# Patient Record
Sex: FEMALE | Race: WHITE
[De-identification: names, ages, dates, MRNs, and addresses within clinical notes are randomized per-mention and may not be internally consistent; named-entity substitution may affect disease eponyms.]

---

## 2018-04-09 ENCOUNTER — LABORATORY RESULT (OUTPATIENT)
Age: 33
End: 2018-04-09

## 2018-04-09 ENCOUNTER — APPOINTMENT (OUTPATIENT)
Dept: INTERNAL MEDICINE | Facility: CLINIC | Age: 33
End: 2018-04-09
Payer: COMMERCIAL

## 2018-04-09 VITALS
OXYGEN SATURATION: 97 % | HEART RATE: 82 BPM | HEIGHT: 65 IN | TEMPERATURE: 98.5 F | SYSTOLIC BLOOD PRESSURE: 136 MMHG | BODY MASS INDEX: 39.65 KG/M2 | WEIGHT: 238 LBS | DIASTOLIC BLOOD PRESSURE: 84 MMHG | RESPIRATION RATE: 14 BRPM

## 2018-04-09 DIAGNOSIS — D22.9 MELANOCYTIC NEVI, UNSPECIFIED: ICD-10-CM

## 2018-04-09 DIAGNOSIS — Z84.89 FAMILY HISTORY OF OTHER SPECIFIED CONDITIONS: ICD-10-CM

## 2018-04-09 DIAGNOSIS — M79.645 PAIN IN LEFT FINGER(S): ICD-10-CM

## 2018-04-09 DIAGNOSIS — Z87.891 PERSONAL HISTORY OF NICOTINE DEPENDENCE: ICD-10-CM

## 2018-04-09 PROCEDURE — 36415 COLL VENOUS BLD VENIPUNCTURE: CPT

## 2018-04-09 PROCEDURE — 99395 PREV VISIT EST AGE 18-39: CPT | Mod: 25

## 2018-04-10 RX ORDER — NAPROXEN SODIUM 220 MG
220 TABLET ORAL
Refills: 0 | Status: ACTIVE | COMMUNITY

## 2018-04-11 LAB
25(OH)D3 SERPL-MCNC: 12.6 NG/ML
ALBUMIN SERPL ELPH-MCNC: 4.3 G/DL
ALP BLD-CCNC: 62 U/L
ALT SERPL-CCNC: 13 U/L
ANION GAP SERPL CALC-SCNC: 15 MMOL/L
AST SERPL-CCNC: 15 U/L
BASOPHILS # BLD AUTO: 0.07 K/UL
BASOPHILS NFR BLD AUTO: 0.5 %
BILIRUB SERPL-MCNC: 1.1 MG/DL
BUN SERPL-MCNC: 9 MG/DL
C TRACH RRNA SPEC QL NAA+PROBE: NOT DETECTED
CALCIUM SERPL-MCNC: 9.4 MG/DL
CHLORIDE SERPL-SCNC: 102 MMOL/L
CHOLEST SERPL-MCNC: 139 MG/DL
CHOLEST/HDLC SERPL: 2.8 RATIO
CO2 SERPL-SCNC: 22 MMOL/L
CREAT SERPL-MCNC: 0.8 MG/DL
EOSINOPHIL # BLD AUTO: 2.56 K/UL
EOSINOPHIL NFR BLD AUTO: 18.4 %
GLUCOSE SERPL-MCNC: 92 MG/DL
HBA1C MFR BLD HPLC: 4.8 %
HBV CORE IGG+IGM SER QL: NONREACTIVE
HBV SURFACE AB SER QL: NONREACTIVE
HBV SURFACE AG SER QL: NONREACTIVE
HCT VFR BLD CALC: 35.5 %
HCV AB SER QL: NONREACTIVE
HCV S/CO RATIO: 0.14 S/CO
HDLC SERPL-MCNC: 50 MG/DL
HGB BLD-MCNC: 11.3 G/DL
HIV1+2 AB SPEC QL IA.RAPID: NONREACTIVE
IMM GRANULOCYTES NFR BLD AUTO: 0.4 %
LDLC SERPL CALC-MCNC: 53 MG/DL
LYMPHOCYTES # BLD AUTO: 3.59 K/UL
LYMPHOCYTES NFR BLD AUTO: 25.8 %
MAN DIFF?: NORMAL
MCHC RBC-ENTMCNC: 20.4 PG
MCHC RBC-ENTMCNC: 31.8 GM/DL
MCV RBC AUTO: 64.1 FL
MONOCYTES # BLD AUTO: 0.76 K/UL
MONOCYTES NFR BLD AUTO: 5.5 %
N GONORRHOEA RRNA SPEC QL NAA+PROBE: NOT DETECTED
NEUTROPHILS # BLD AUTO: 6.89 K/UL
NEUTROPHILS NFR BLD AUTO: 49.4 %
PLATELET # BLD AUTO: 267 K/UL
POTASSIUM SERPL-SCNC: 4.1 MMOL/L
PROT SERPL-MCNC: 7.9 G/DL
RBC # BLD: 5.54 M/UL
RBC # FLD: 16.2 %
SODIUM SERPL-SCNC: 139 MMOL/L
SOURCE AMPLIFICATION: NORMAL
T PALLIDUM AB SER QL IA: NEGATIVE
TRIGL SERPL-MCNC: 180 MG/DL
TSH SERPL-ACNC: 2.89 UIU/ML
WBC # FLD AUTO: 13.93 K/UL

## 2018-04-11 RX ORDER — ERGOCALCIFEROL 1.25 MG/1
1.25 MG CAPSULE, LIQUID FILLED ORAL
Qty: 8 | Refills: 0 | Status: ACTIVE | COMMUNITY
Start: 2018-04-11 | End: 1900-01-01

## 2018-04-20 ENCOUNTER — APPOINTMENT (OUTPATIENT)
Dept: INFECTIOUS DISEASE | Facility: CLINIC | Age: 33
End: 2018-04-20

## 2018-05-03 ENCOUNTER — LABORATORY RESULT (OUTPATIENT)
Age: 33
End: 2018-05-03

## 2018-05-04 LAB
BASOPHILS # BLD AUTO: 0.08 K/UL
BASOPHILS NFR BLD AUTO: 0.5 %
EOSINOPHIL # BLD AUTO: 2.73 K/UL
EOSINOPHIL NFR BLD AUTO: 16.4 %
FERRITIN SERPL-MCNC: 220 NG/ML
FOLATE SERPL-MCNC: 17.6 NG/ML
HCT VFR BLD CALC: 30.2 %
HGB BLD-MCNC: 10 G/DL
IMM GRANULOCYTES NFR BLD AUTO: 0.7 %
IRON SATN MFR SERPL: 12 %
IRON SERPL-MCNC: 27 UG/DL
LYMPHOCYTES # BLD AUTO: 3.45 K/UL
LYMPHOCYTES NFR BLD AUTO: 20.7 %
MAN DIFF?: NORMAL
MCHC RBC-ENTMCNC: 20.6 PG
MCHC RBC-ENTMCNC: 33.1 GM/DL
MCV RBC AUTO: 62.3 FL
MONOCYTES # BLD AUTO: 0.93 K/UL
MONOCYTES NFR BLD AUTO: 5.6 %
NEUTROPHILS # BLD AUTO: 9.34 K/UL
NEUTROPHILS NFR BLD AUTO: 56.1 %
PLATELET # BLD AUTO: 247 K/UL
RBC # BLD: 4.85 M/UL
RBC # FLD: 15.7 %
TIBC SERPL-MCNC: 222 UG/DL
UIBC SERPL-MCNC: 195 UG/DL
VIT B12 SERPL-MCNC: 415 PG/ML
WBC # FLD AUTO: 16.65 K/UL

## 2018-05-04 RX ORDER — CHLORHEXIDINE GLUCONATE 4 %
325 (65 FE) LIQUID (ML) TOPICAL
Refills: 0 | Status: ACTIVE | COMMUNITY
Start: 2018-05-04

## 2018-05-07 LAB
HGB A MFR BLD: 92.6 %
HGB A2 MFR BLD: 5.1 %
HGB F MFR BLD: 2.3 %
HGB FRACT BLD-IMP: NORMAL

## 2018-05-11 ENCOUNTER — APPOINTMENT (OUTPATIENT)
Dept: INTERNAL MEDICINE | Facility: CLINIC | Age: 33
End: 2018-05-11

## 2018-05-14 ENCOUNTER — APPOINTMENT (OUTPATIENT)
Dept: ORTHOPEDIC SURGERY | Facility: CLINIC | Age: 33
End: 2018-05-14
Payer: COMMERCIAL

## 2018-05-14 VITALS
WEIGHT: 230 LBS | HEIGHT: 66 IN | SYSTOLIC BLOOD PRESSURE: 136 MMHG | HEART RATE: 109 BPM | BODY MASS INDEX: 36.96 KG/M2 | DIASTOLIC BLOOD PRESSURE: 93 MMHG

## 2018-05-14 DIAGNOSIS — M72.2 PLANTAR FASCIAL FIBROMATOSIS: ICD-10-CM

## 2018-05-14 DIAGNOSIS — M77.9 ENTHESOPATHY, UNSPECIFIED: ICD-10-CM

## 2018-05-14 DIAGNOSIS — M62.89 OTHER SPECIFIED DISORDERS OF MUSCLE: ICD-10-CM

## 2018-05-14 DIAGNOSIS — M76.60 ACHILLES TENDINITIS, UNSPECIFIED LEG: ICD-10-CM

## 2018-05-14 PROCEDURE — 99203 OFFICE O/P NEW LOW 30 MIN: CPT

## 2018-05-14 PROCEDURE — 73630 X-RAY EXAM OF FOOT: CPT | Mod: 50

## 2018-05-14 PROCEDURE — 73600 X-RAY EXAM OF ANKLE: CPT | Mod: RT

## 2018-05-15 LAB — HEMOCCULT STL QL IA: NEGATIVE

## 2018-05-17 ENCOUNTER — APPOINTMENT (OUTPATIENT)
Dept: INTERNAL MEDICINE | Facility: CLINIC | Age: 33
End: 2018-05-17
Payer: COMMERCIAL

## 2018-05-17 ENCOUNTER — TRANSCRIPTION ENCOUNTER (OUTPATIENT)
Age: 33
End: 2018-05-17

## 2018-05-17 ENCOUNTER — LABORATORY RESULT (OUTPATIENT)
Age: 33
End: 2018-05-17

## 2018-05-17 VITALS
DIASTOLIC BLOOD PRESSURE: 84 MMHG | TEMPERATURE: 98.1 F | RESPIRATION RATE: 16 BRPM | WEIGHT: 230 LBS | BODY MASS INDEX: 36.96 KG/M2 | HEIGHT: 66 IN | HEART RATE: 89 BPM | OXYGEN SATURATION: 98 % | SYSTOLIC BLOOD PRESSURE: 130 MMHG

## 2018-05-17 DIAGNOSIS — E66.9 OBESITY, UNSPECIFIED: ICD-10-CM

## 2018-05-17 DIAGNOSIS — D56.3 THALASSEMIA MINOR: ICD-10-CM

## 2018-05-17 DIAGNOSIS — E55.9 VITAMIN D DEFICIENCY, UNSPECIFIED: ICD-10-CM

## 2018-05-17 PROCEDURE — 99215 OFFICE O/P EST HI 40 MIN: CPT | Mod: 25

## 2018-05-17 PROCEDURE — 36415 COLL VENOUS BLD VENIPUNCTURE: CPT

## 2018-05-17 PROCEDURE — 90746 HEPB VACCINE 3 DOSE ADULT IM: CPT

## 2018-05-17 PROCEDURE — 90471 IMMUNIZATION ADMIN: CPT

## 2018-05-17 RX ORDER — CHROMIUM 200 MCG
1000 TABLET ORAL DAILY
Qty: 1 | Refills: 1 | Status: ACTIVE | COMMUNITY
Start: 2018-05-17 | End: 1900-01-01

## 2018-05-17 RX ORDER — FERROUS GLUCONATE 324(38)MG
324 (38 FE) TABLET ORAL 3 TIMES DAILY
Qty: 1 | Refills: 3 | Status: ACTIVE | COMMUNITY
Start: 2018-05-17 | End: 1900-01-01

## 2018-05-18 PROBLEM — D56.3 BETA THALASSEMIA MINOR: Status: ACTIVE | Noted: 2018-05-18

## 2018-05-18 LAB
ANION GAP SERPL CALC-SCNC: 15 MMOL/L
BASOPHILS # BLD AUTO: 0.07 K/UL
BASOPHILS NFR BLD AUTO: 0.6 %
BUN SERPL-MCNC: 11 MG/DL
CALCIUM SERPL-MCNC: 9.7 MG/DL
CHLORIDE SERPL-SCNC: 100 MMOL/L
CO2 SERPL-SCNC: 25 MMOL/L
CREAT SERPL-MCNC: 0.75 MG/DL
EOSINOPHIL # BLD AUTO: 1.08 K/UL
EOSINOPHIL NFR BLD AUTO: 9.2 %
GLUCOSE SERPL-MCNC: 102 MG/DL
HCT VFR BLD CALC: 35.1 %
HGB BLD-MCNC: 11.1 G/DL
IMM GRANULOCYTES NFR BLD AUTO: 0.5 %
LYMPHOCYTES # BLD AUTO: 3.09 K/UL
LYMPHOCYTES NFR BLD AUTO: 26.3 %
MAN DIFF?: NORMAL
MCHC RBC-ENTMCNC: 20.3 PG
MCHC RBC-ENTMCNC: 31.6 GM/DL
MCV RBC AUTO: 64.2 FL
MONOCYTES # BLD AUTO: 0.66 K/UL
MONOCYTES NFR BLD AUTO: 5.6 %
NEUTROPHILS # BLD AUTO: 6.8 K/UL
NEUTROPHILS NFR BLD AUTO: 57.8 %
PLATELET # BLD AUTO: 323 K/UL
POTASSIUM SERPL-SCNC: 4.3 MMOL/L
RBC # BLD: 5.47 M/UL
RBC # FLD: 15.4 %
SODIUM SERPL-SCNC: 140 MMOL/L
WBC # FLD AUTO: 11.76 K/UL

## 2018-05-18 NOTE — HEALTH RISK ASSESSMENT
[Patient reported PAP Smear was normal] : Patient reported PAP Smear was normal [PapSmearDate] : > 10 yrs ago

## 2018-05-18 NOTE — HISTORY OF PRESENT ILLNESS
[FreeTextEntry1] : \par Feeling well today.\par \par \par Reports went to urgent care last week with URI sx's, cough- dx'd bronchitis, given zpak (finished 5/11/18) and now improved-has min cough, dry\par -denies fever, chills or sob\par -denies GI or  complaints.\par -told needs noted to clear for fit testing at [facility] given recent illness\par \par \par hx staring episodes- no recurrence since last visit- neuro eval pending 6/18\par -Wife reports episode 6 mo ago when patient was awake but not responding to name (was laying down just staring at TV) and recalls eyes were moving side to side quickly, observed x 30 seconds- when came to seemed confused x 1 min.  Denies body jerking, foaming at mouth, tongue biting or incontinence. -Pt recalls hx similar episode while alone few months ago - was laying there and felt couldn't move body x few minutes- episode not observed\par -denies vision trouble, HA, dizziness, dysarthria or focal weakness\par -denies hx seizure, hx dx in [facility]\par \par hx right foot pain-- hx ligamentous tear tx'd conservatively, improved but gets occ pain due feels is due to heel spur and plantar fasciitis\par -seen by new ortho 5/18, xrays done and referred to PT which is pending\par -on aleve prn with help, denies GI sx's with use\par \par Recently started cut down of meat\par exercising: gym machines- treadmill 1-2x/wk x 20 mins\par \par vit d def-- on tx course\par \par \par Denies  complaints.\par

## 2018-05-18 NOTE — PHYSICAL EXAM
[General Appearance - Alert] : alert [General Appearance - In No Acute Distress] : in no acute distress [Sclera] : the sclera and conjunctiva were normal [PERRL With Normal Accommodation] : pupils were equal in size, round, and reactive to light [Extraocular Movements] : extraocular movements were intact [Outer Ear] : the ears and nose were normal in appearance [Nasal Cavity] : the nasal mucosa and septum were normal [Oropharynx] : the oropharynx was normal [Neck Appearance] : the appearance of the neck was normal [Thyroid Diffuse Enlargement] : the thyroid was not enlarged [Respiration, Rhythm And Depth] : normal respiratory rhythm and effort [Auscultation Breath Sounds / Voice Sounds] : lungs were clear to auscultation bilaterally [Heart Rate And Rhythm] : heart rate was normal and rhythm regular [Heart Sounds] : normal S1 and S2 [Murmurs] : no murmurs [Full Pulse] : the pedal pulses are present [Edema] : there was no peripheral edema [Bowel Sounds] : normal bowel sounds [Abdomen Soft] : soft [Abdomen Tenderness] : non-tender [Cervical Lymph Nodes Enlarged Posterior Bilaterally] : posterior cervical [Cervical Lymph Nodes Enlarged Anterior Bilaterally] : anterior cervical [Supraclavicular Lymph Nodes Enlarged Bilaterally] : supraclavicular [No CVA Tenderness] : no ~M costovertebral angle tenderness [No Spinal Tenderness] : no spinal tenderness [Abnormal Walk] : normal gait [] : no rash [No Focal Deficits] : no focal deficits [Oriented To Time, Place, And Person] : oriented to person, place, and time [Affect] : the affect was normal [Mood] : the mood was normal [FreeTextEntry1] : scattered freckles

## 2018-05-18 NOTE — REVIEW OF SYSTEMS
[see HPI] : see HPI [Negative] : Psychiatric [Fever] : no fever [Chills] : no chills [Chest Pain] : no chest pain [Palpitations] : no palpitations [Cough] : no cough [SOB on Exertion] : no shortness of breath during exertion

## 2018-05-18 NOTE — ASSESSMENT
[FreeTextEntry1] : \par hx bronchitis- s/p urgent care eval with zpak (finished 5/11/18)- improved now with min residual cough\par -check cbc to monitor prior noted leukocytosis\par -note given to proceed with fit testing at [facility]\par \par hx staring episodes a/w ? nystagmus- possible seizure; neuro exam wnl \par -neurology eval pending 6/18\par -advised prompt ER eval if sx's recur\par \par hx right foot pain- onset 2014 heard "pop" with stepping back w/o hx of focal trauma\par -8/14 xray w/o fracture or dislocation, +calcaneal spur\par -9/14 MRI: severe plantar fasciitis with mild partial thickness tear, large plantar calcaneal enthesophyte\par -seen by new ortho 5/18, xrays done and referred to PT which is pending\par -on aleve prn, advised to take with food and alternate with Tylenol ES\par -advised good supportive shoes, ice, stretching\par \par anemia- iron def, + beta thalassemia minor\par -5/18 H/H-10/30\par -to start iron\par -5/18 FIT card negative\par \par vit d def- on tx\par -advised OTC vit d 1000U/d after tx course\par -check level\par \par obesity\par -healthy eating, exercise and wt loss encouraged\par -declines nutritionist or wt management referrals\par \par \par \par HCM\par --hx CPE 4/18\par --hx flu shot 3/18 at work\par --hx Tdap 5/14\par --hx incomplete hep B series, 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  Yearly screening and regular use of sun block for skin cancer prevention counseled.\par --optho referral for screening- pending\par --Cont'd smoking cessation encouraged\par \par \par Pt's cell: [phone]

## 2018-06-14 ENCOUNTER — APPOINTMENT (OUTPATIENT)
Dept: NEUROLOGY | Facility: CLINIC | Age: 33
End: 2018-06-14
Payer: COMMERCIAL

## 2018-06-14 VITALS
WEIGHT: 230 LBS | HEIGHT: 66 IN | BODY MASS INDEX: 36.96 KG/M2 | DIASTOLIC BLOOD PRESSURE: 93 MMHG | SYSTOLIC BLOOD PRESSURE: 141 MMHG | HEART RATE: 91 BPM

## 2018-06-14 DIAGNOSIS — R12 HEARTBURN: ICD-10-CM

## 2018-06-14 PROCEDURE — 99205 OFFICE O/P NEW HI 60 MIN: CPT

## 2018-06-14 RX ORDER — PANTOPRAZOLE 40 MG/1
40 TABLET, DELAYED RELEASE ORAL DAILY
Qty: 30 | Refills: 0 | Status: ACTIVE | COMMUNITY
Start: 2018-06-14 | End: 1900-01-01

## 2018-06-14 RX ORDER — INDOMETHACIN 25 MG/1
25 CAPSULE ORAL 3 TIMES DAILY
Qty: 42 | Refills: 1 | Status: ACTIVE | COMMUNITY
Start: 2018-06-14 | End: 1900-01-01

## 2018-06-15 ENCOUNTER — OTHER (OUTPATIENT)
Age: 33
End: 2018-06-15

## 2018-06-18 ENCOUNTER — LABORATORY RESULT (OUTPATIENT)
Age: 33
End: 2018-06-18

## 2018-06-18 ENCOUNTER — APPOINTMENT (OUTPATIENT)
Dept: INTERNAL MEDICINE | Facility: CLINIC | Age: 33
End: 2018-06-18
Payer: COMMERCIAL

## 2018-06-18 VITALS
DIASTOLIC BLOOD PRESSURE: 66 MMHG | BODY MASS INDEX: 37.61 KG/M2 | SYSTOLIC BLOOD PRESSURE: 130 MMHG | WEIGHT: 234 LBS | HEART RATE: 84 BPM | TEMPERATURE: 98.6 F | RESPIRATION RATE: 16 BRPM | HEIGHT: 66 IN | OXYGEN SATURATION: 98 %

## 2018-06-18 DIAGNOSIS — D72.829 ELEVATED WHITE BLOOD CELL COUNT, UNSPECIFIED: ICD-10-CM

## 2018-06-18 DIAGNOSIS — R51 HEADACHE: ICD-10-CM

## 2018-06-18 DIAGNOSIS — M79.673 PAIN IN UNSPECIFIED FOOT: ICD-10-CM

## 2018-06-18 DIAGNOSIS — R41.89 OTHER SYMPTOMS AND SIGNS INVOLVING COGNITIVE FUNCTIONS AND AWARENESS: ICD-10-CM

## 2018-06-18 DIAGNOSIS — Z00.00 ENCOUNTER FOR GENERAL ADULT MEDICAL EXAMINATION W/OUT ABNORMAL FINDINGS: ICD-10-CM

## 2018-06-18 DIAGNOSIS — D64.9 ANEMIA, UNSPECIFIED: ICD-10-CM

## 2018-06-18 PROCEDURE — 90746 HEPB VACCINE 3 DOSE ADULT IM: CPT

## 2018-06-18 PROCEDURE — 90471 IMMUNIZATION ADMIN: CPT

## 2018-06-18 PROCEDURE — 36415 COLL VENOUS BLD VENIPUNCTURE: CPT

## 2018-06-18 PROCEDURE — 99215 OFFICE O/P EST HI 40 MIN: CPT | Mod: 25

## 2018-06-18 NOTE — PHYSICAL EXAM
[General Appearance - Alert] : alert [General Appearance - In No Acute Distress] : in no acute distress [Sclera] : the sclera and conjunctiva were normal [PERRL With Normal Accommodation] : pupils were equal in size, round, and reactive to light [Extraocular Movements] : extraocular movements were intact [Outer Ear] : the ears and nose were normal in appearance [Nasal Cavity] : the nasal mucosa and septum were normal [Oropharynx] : the oropharynx was normal [Neck Appearance] : the appearance of the neck was normal [Thyroid Diffuse Enlargement] : the thyroid was not enlarged [Respiration, Rhythm And Depth] : normal respiratory rhythm and effort [Auscultation Breath Sounds / Voice Sounds] : lungs were clear to auscultation bilaterally [Heart Rate And Rhythm] : heart rate was normal and rhythm regular [Heart Sounds] : normal S1 and S2 [Murmurs] : no murmurs [Full Pulse] : the pedal pulses are present [Edema] : there was no peripheral edema [Bowel Sounds] : normal bowel sounds [Abdomen Soft] : soft [Abdomen Tenderness] : non-tender [Cervical Lymph Nodes Enlarged Posterior Bilaterally] : posterior cervical [Cervical Lymph Nodes Enlarged Anterior Bilaterally] : anterior cervical [Supraclavicular Lymph Nodes Enlarged Bilaterally] : supraclavicular [No CVA Tenderness] : no ~M costovertebral angle tenderness [No Spinal Tenderness] : no spinal tenderness [Abnormal Walk] : normal gait [No Focal Deficits] : no focal deficits [Oriented To Time, Place, And Person] : oriented to person, place, and time [Affect] : the affect was normal [Mood] : the mood was normal [] : no hepato-splenomegaly [FreeTextEntry1] : scattered freckles

## 2018-06-18 NOTE — HISTORY OF PRESENT ILLNESS
[FreeTextEntry1] : \par Feeling well today.\par \par c/o occ reflux, occ epigastric pain- unsufe of associated foods, but notes drinks multiple cups of coffee per day\par -does take aleve regularly for foot pain\par -denies throat pain, dysphagia, n/v, BRBPR or melena\par -has PPI given by neuro recently to take concurrent with NSAIDs- not started yet\par \par hx leukocytosis- is s/p URI sx's seen at urgent care prior to last visit in 5/18, cough- dx'd bronchitis, given zpak (finished 5/11/18) and now resolved sx's\par -denies fever, chills or sob\par -denies GI or  complaints.\par \par hx staring episodes, HAs- no recurrence since last visit, HAs stable- relieved with aleve\par -seen by neuro 6/18, awaiting MRI and EEG.  Given trial of indomethacin with pantoprazole- not started yet.\par -Wife reports episode > 6 mo ago when patient was awake but not responding to name (was laying down just staring at TV) and recalls eyes were moving side to side quickly, observed x 30 seconds- when came to seemed confused x 1 min.  Denies body jerking, foaming at mouth, tongue biting or incontinence. -Pt recalls hx similar episode while alone few months ago - was laying there and felt couldn't move body x few minutes- episode not observed\par -denies vision trouble, HA, dizziness, dysarthria or focal weakness\par -denies hx seizure, hx dx in [facility]\par \par hx right foot pain-- stable\par -hx ligamentous tear tx'd conservatively, improved but gets occ pain due feels is due to heel spur and plantar fasciitis\par -seen by new ortho 5/18, xrays done and referred to PT which is pending, MRI pending\par -on aleve prn with help, denies GI sx's with use\par \par Recently started cut down of meat\par exercising: gym machines- treadmill 1-2x/wk x 20 mins\par \par anemia, hx B-thal minor\par -on iron BID- TID since 5/18\par -denies clinical bleeding\par -hx negative FIT 5/18\par \par vit d def-- on tx course, 1 week left\par \par \par Denies  complaints.\par

## 2018-06-18 NOTE — REVIEW OF SYSTEMS
[Negative] : Psychiatric [see HPI] : see HPI [Fever] : no fever [Chills] : no chills [Chest Pain] : no chest pain [Palpitations] : no palpitations [Cough] : no cough [SOB on Exertion] : no shortness of breath during exertion

## 2018-06-18 NOTE — ASSESSMENT
[FreeTextEntry1] : \par hx bronchitis- s/p urgent care eval with zpak (finished 5/11/18)- now resolved\par -5/18 wbc 11.7 (was 16 prior)\par -check cbc to monitor prior noted leukocytosis\par \par hx staring episodes a/w ? nystagmus, HAs- possible seizure; neuro exam wnl \par -followed by neuro- seen  6/18, awaiting MRI and EEG.  Given trial of indomethacin with pantoprazole- not started yet.\par -advised prompt ER eval if sx's recur\par \par hx right foot pain- stable, onset 2014 heard "pop" with stepping back w/o hx of focal trauma\par -8/14 xray w/o fracture or dislocation, +calcaneal spur\par -9/14 MRI: severe plantar fasciitis with mild partial thickness tear, large plantar calcaneal enthesophyte\par -seen by new ortho 5/18, xrays done and referred to PT which is pending\par -on aleve prn , advised to take with food and PPI and alternate with Tylenol ES as able\par -advised good supportive shoes, ice, stretching\par \par hx GERD, occ epigastric pain- exam unremarkable\par -5/18 FIT negative\par -PPI trial\par -advised to cut down on aggravating foods, possibly caffeine, cut down on NSAIDs and avoid meals near bedtime\par -advised to f/u if sx's worsen, to consider GI eval then\par \par anemia- iron def, + beta thalassemia minor\par -5/18 B12/folate wnl; Hg electrophoresis +\par -5/18 H/H-10/30--> 11.1/35--> check repeat\par -on iron since 5/18\par -5/18 FIT card negative\par \par vit d def- on tx\par -advised OTC vit d 1000U/d after tx course\par \par obesity\par -healthy eating, exercise and wt loss encouraged\par -declines nutritionist or wt management referrals\par \par \par \par HCM\par --hx CPE 4/18\par --hx flu shot 3/18 at work\par --hx Tdap 5/14\par --hx incomplete hep B series, serology negative- getting series,for #2 and for #3 in 11/18\par --planned to get hep A series at [facility] per pt\par --hx negative PAP 5/18 by GYN per pt\par --hx derm skin screening exam 2016, has own doctor.  Yearly screening and regular use of sun block for skin cancer prevention counseled.\par --optho referral for screening- pending\par --Cont'd smoking cessation encouraged\par \par \par Pt's cell: [phone]

## 2018-06-20 LAB
FERRITIN SERPL-MCNC: 206 NG/ML
IRON SATN MFR SERPL: 27 %
IRON SERPL-MCNC: 77 UG/DL
TIBC SERPL-MCNC: 284 UG/DL
UIBC SERPL-MCNC: 207 UG/DL

## 2018-07-06 ENCOUNTER — APPOINTMENT (OUTPATIENT)
Dept: NEUROLOGY | Facility: CLINIC | Age: 33
End: 2018-07-06

## 2018-08-09 ENCOUNTER — APPOINTMENT (OUTPATIENT)
Dept: INTERNAL MEDICINE | Facility: CLINIC | Age: 33
End: 2018-08-09

## 2018-11-16 ENCOUNTER — APPOINTMENT (OUTPATIENT)
Dept: INTERNAL MEDICINE | Facility: CLINIC | Age: 33
End: 2018-11-16

## 2021-01-11 ENCOUNTER — TRANSCRIPTION ENCOUNTER (OUTPATIENT)
Age: 36
End: 2021-01-11

## 2024-09-03 ENCOUNTER — APPOINTMENT (OUTPATIENT)
Dept: INTERNAL MEDICINE | Facility: CLINIC | Age: 39
End: 2024-09-03